# Patient Record
Sex: FEMALE | Race: ASIAN | Employment: FULL TIME | URBAN - METROPOLITAN AREA
[De-identification: names, ages, dates, MRNs, and addresses within clinical notes are randomized per-mention and may not be internally consistent; named-entity substitution may affect disease eponyms.]

---

## 2018-05-24 RX ORDER — ATROPINE SULFATE 0.1 MG/ML
0.5 INJECTION INTRAVENOUS
Status: CANCELLED | OUTPATIENT
Start: 2018-05-24 | End: 2018-05-24

## 2018-05-24 RX ORDER — EPINEPHRINE 0.1 MG/ML
1 INJECTION INTRACARDIAC; INTRAVENOUS
Status: CANCELLED | OUTPATIENT
Start: 2018-05-24 | End: 2018-05-24

## 2018-05-24 RX ORDER — DEXTROMETHORPHAN/PSEUDOEPHED 2.5-7.5/.8
1.2 DROPS ORAL
Status: CANCELLED | OUTPATIENT
Start: 2018-05-24

## 2018-05-25 ENCOUNTER — HOSPITAL ENCOUNTER (OUTPATIENT)
Age: 53
Setting detail: OUTPATIENT SURGERY
Discharge: HOME | End: 2018-05-25
Attending: INTERNAL MEDICINE | Admitting: INTERNAL MEDICINE
Payer: COMMERCIAL

## 2018-05-25 VITALS
SYSTOLIC BLOOD PRESSURE: 104 MMHG | HEART RATE: 46 BPM | RESPIRATION RATE: 15 BRPM | DIASTOLIC BLOOD PRESSURE: 63 MMHG | TEMPERATURE: 96.8 F | HEIGHT: 62 IN | OXYGEN SATURATION: 99 % | BODY MASS INDEX: 24.69 KG/M2 | WEIGHT: 134.2 LBS

## 2018-05-25 PROCEDURE — 76040000007: Performed by: INTERNAL MEDICINE

## 2018-05-25 PROCEDURE — 74011250636 HC RX REV CODE- 250/636

## 2018-05-25 PROCEDURE — G0500 MOD SEDAT ENDO SERVICE >5YRS: HCPCS | Performed by: INTERNAL MEDICINE

## 2018-05-25 PROCEDURE — 74011250636 HC RX REV CODE- 250/636: Performed by: INTERNAL MEDICINE

## 2018-05-25 PROCEDURE — 77030020256 HC SOL INJ NACL 0.9%  500ML: Performed by: INTERNAL MEDICINE

## 2018-05-25 RX ORDER — FLUMAZENIL 0.1 MG/ML
0.2 INJECTION INTRAVENOUS
Status: DISCONTINUED | OUTPATIENT
Start: 2018-05-25 | End: 2018-05-25 | Stop reason: HOSPADM

## 2018-05-25 RX ORDER — NALOXONE HYDROCHLORIDE 0.4 MG/ML
0.4 INJECTION, SOLUTION INTRAMUSCULAR; INTRAVENOUS; SUBCUTANEOUS
Status: DISCONTINUED | OUTPATIENT
Start: 2018-05-25 | End: 2018-05-25 | Stop reason: HOSPADM

## 2018-05-25 RX ORDER — SODIUM CHLORIDE 9 MG/ML
100 INJECTION, SOLUTION INTRAVENOUS CONTINUOUS
Status: DISCONTINUED | OUTPATIENT
Start: 2018-05-25 | End: 2018-05-25 | Stop reason: HOSPADM

## 2018-05-25 RX ORDER — MIDAZOLAM HYDROCHLORIDE 1 MG/ML
.5-5 INJECTION, SOLUTION INTRAMUSCULAR; INTRAVENOUS
Status: DISCONTINUED | OUTPATIENT
Start: 2018-05-25 | End: 2018-05-25 | Stop reason: HOSPADM

## 2018-05-25 RX ORDER — BISMUTH SUBSALICYLATE 262 MG
1 TABLET,CHEWABLE ORAL DAILY
COMMUNITY

## 2018-05-25 RX ORDER — FENTANYL CITRATE 50 UG/ML
100 INJECTION, SOLUTION INTRAMUSCULAR; INTRAVENOUS
Status: DISCONTINUED | OUTPATIENT
Start: 2018-05-25 | End: 2018-05-25 | Stop reason: HOSPADM

## 2018-05-25 RX ADMIN — SODIUM CHLORIDE 100 ML/HR: 900 INJECTION, SOLUTION INTRAVENOUS at 08:38

## 2018-05-25 NOTE — DISCHARGE INSTRUCTIONS
Ramos Wu   DISCHARGE SUMMARY from Nurse    PATIENT INSTRUCTIONS:    After general anesthesia or intravenous sedation, for 24 hours or while taking prescription Narcotics:  · Limit your activities  · Do not drive and operate hazardous machinery  · Do not make important personal or business decisions  · Do  not drink alcoholic beverages  · If you have not urinated within 8 hours after discharge, please contact your surgeon on call. Report the following to your surgeon:  · Excessive pain, swelling, redness or odor of or around the surgical area  · Temperature over 100.5  · Nausea and vomiting lasting longer than 4 hours or if unable to take medications  · Any signs of decreased circulation or nerve impairment to extremity: change in color, persistent  numbness, tingling, coldness or increase pain  · Any questions    What to do at Home:  Recommended activity: Activity as tolerated,   If you experience any of the following symptoms  As above , please follow up with Doctor Phillip Rayo  *  Please give a list of your current medications to your Primary Care Provider. *  Please update this list whenever your medications are discontinued, doses are      changed, or new medications (including over-the-counter products) are added. *  Please carry medication information at all times in case of emergency situations. These are general instructions for a healthy lifestyle:    No smoking/ No tobacco products/ Avoid exposure to second hand smoke  Surgeon General's Warning:  Quitting smoking now greatly reduces serious risk to your health.     Obesity, smoking, and sedentary lifestyle greatly increases your risk for illness    A healthy diet, regular physical exercise & weight monitoring are important for maintaining a healthy lifestyle    You may be retaining fluid if you have a history of heart failure or if you experience any of the following symptoms:  Weight gain of 3 pounds or more overnight or 5 pounds in a week, increased swelling in our hands or feet or shortness of breath while lying flat in bed. Please call your doctor as soon as you notice any of these symptoms; do not wait until your next office visit. Recognize signs and symptoms of STROKE:    F-face looks uneven    A-arms unable to move or move unevenly    S-speech slurred or non-existent    T-time-call 911 as soon as signs and symptoms begin-DO NOT go       Back to bed or wait to see if you get better-TIME IS BRAIN. Warning Signs of HEART ATTACK     Call 911 if you have these symptoms:   Chest discomfort. Most heart attacks involve discomfort in the center of the chest that lasts more than a few minutes, or that goes away and comes back. It can feel like uncomfortable pressure, squeezing, fullness, or pain.  Discomfort in other areas of the upper body. Symptoms can include pain or discomfort in one or both arms, the back, neck, jaw, or stomach.  Shortness of breath with or without chest discomfort.  Other signs may include breaking out in a cold sweat, nausea, or lightheadedness. Don't wait more than five minutes to call 911 - MINUTES MATTER! Fast action can save your life. Calling 911 is almost always the fastest way to get lifesaving treatment. Emergency Medical Services staff can begin treatment when they arrive -- up to an hour sooner than if someone gets to the hospital by car. The discharge information has been reviewed with the patient and spouse. The patient and spouse verbalized understanding. Discharge medications reviewed with the patient and spouse and appropriate educational materials and side effects teaching were provided.   ___________________________________________________________________________________________________________________________________  [id number]  1965    COLON DISCHARGE INSTRUCTIONS    Discomfort:  Redness at IV site- apply warm compress to area; if redness or soreness persist- contact your physician  There may be a slight amount of blood passed from the rectum  Gaseous discomfort- walking, belching will help relieve any discomfort  You may not operate a vehicle til the next day. You may not engage in an occupation involving machinery or appliances til the next day. You may not drink alcoholic beverages til the next day. DIET:   High fiber diet. ACTIVITY:  You may not  resume your normal daily activities til the next day. it is recommended that you spend the remainder of the day resting -  avoid any strenuous activity. CALL M.D.  IF ANY SIGN OF:   Increasing pain, nausea, vomiting  Abdominal distension (swelling)  New increased bleeding (oral or rectal)  Fever (chills)  Pain in chest area  Bloody discharge from nose or mouth  Shortness of breath    You may  take any Advil, Aspirin, Ibuprofen, Motrin, Aleve, or Goodys but preferably Tylenol as needed for pain. Post procedure diagnosis:  Small anal skin tags  Follow-up Instructions: Your follow up colonoscopy will be in 10 years.         Maryan Otero MD  May 25, 2018   Patient armband removed and shredded

## 2018-05-25 NOTE — PROCEDURES
[facility]  Colonoscopy Procedure Report  _______________________________________________________  Patient: Joaquín White                                         Attending Physician: Mayra Navarro MD    Patient ID: [id number]                                      Referring Physician: Juaquin Moncada DO    Exam Date: May 25, 2018 _______________________________________________________      Introduction: A  46 y.o. female patient, presents for outpatient Colonoscopy    Indications: Screen colon cancer, average risk and asymptomatic. Consent: The benefits, risks, and alternatives to the procedure were discussed and informed consent was obtained from the patient. Preparation: EKG, pulse, pulse oximetry and blood pressure were monitored throughout the procedure. ASA Classification: Class 1 - . The heart is an S1-S2 and regular heart rate and rhythm. Lungs are clear to auscultation and percussion. Abdomen is soft, nondistended, and nontender. Mental Status: awake, alert, and oriented to person, place, and time    Medications:  · Fentanyl 100 mcg IV before procedure. · Versed 4 mg IV throughout the procedure. Rectal Exam: Small anal skin tags. Slightly decreased anal sphincter tone. No Blood. Pathology Specimens: No specimens removed. Procedure: The colonoscope was passed with ease through the anus under direct visualization and advanced to the cecum and 15 cm inside the terminal ileum. The patient required positioning on the back to aid in the passage of the scope. The scope was withdrawn and the mucosa was carefully examined. The quality of the preparation was excellent. The views were excellent. The patient's toleration of the procedure was excellent. Retroflexion was preformed in the ascending colon and hepatic flexure. The exam was done twice to the cecum. Total time is 14 minutes and withdrawal time is 8 minutes.     Findings:    Rectum:   Normal  Sigmoid:   normal   Descending Colon:   Normal  Transverse Colon:   Normal  Ascending Colon:   Normal  Cecum:   Normal  Terminal Ileum:   Normal      Unplanned Events: There were no unplanned events. Estimated Blood Loss: None  Impressions:  Small anal skin tags. Slightly decreased anal sphincter tone. Slightly tortuous sigmoid colon. Normal Mucosa. No diverticula or polyps found. Complications: None; patient tolerated the procedure well. Recommendations:  · Discharge home when standard parameters are met. · Resume a high fiber diet. · Colonoscopy recommendation in 10 years.     Procedure Codes:    · COLONOSCOPY [HBA1124 (Type: Custom)]    Endoscope Information:  Model Number(s)    [id number]       Assistant: None      Signed By: Desirae Escobar MD Date: May 25, 2018

## 2018-05-25 NOTE — H&P
This 46year old female presents for Colon Cancer Screening. History of Present Illness:  1. Colon Cancer Screening      No prior screening. Denies risk factors. Pertinent negatives include abdominal pain, change in bowel habits, change in stool caliber, constipation, decreased appetite, diarrhea, melena, nausea, rectal bleeding, vomiting, weight gain and weight loss. Additional information: No family history of colon cancer, No family history of Crohn's/colitis, No NSAID/ASA use, No abd surgeries. BM once a day, no blood. She denies heart attack, SOB, CP, CVA, GERD, DM and paralysis. PROBLEM LIST:  No active problems        PAST MEDICAL/SURGICAL HISTORY   (Detailed)    Disease/disorder Onset Date Management Date Comments     Wrist surgery       DIAGNOSTICS HISTORY:  Test Ordered Interpretation Result completed   Dexa, Bone Density Scan  see detail OSTEOPENIA 2016     Test Ordered Ordering Comments Modifier   Dexa, Bone Density Scan      GYNECOLOGIC HISTORY:  Patient is postmenopausal.   Postmenopausal age: 43. Type of menopause is natural .    Family History:  (Detailed)  Relationship Family Member Name  Age at Death Condition Onset Age Cause of Death   Father  Y       Mother  Y 68            Social History:  (Detailed)  Tobacco use reviewed. Preferred language is [place]. Smoking status: Never smoker. SMOKING STATUS  Use Status Type Smoking Status Usage Per Day Years Used Total Pack Years   no/never  Never smoker          ALCOHOL  There is a history of alcohol use. Type: Beer. 1 beer consumed weekly. Last alcoholic drink was two weeks ago. CAFFEINE  The patient uses caffeine: coffee - 2 cups a day. LIFESTYLE  Moderate activity level. Exercises daily. Patient Status   Completed with information received for patient transitioning into care. Medication Reconciliation  Medications reconciled today.   Medication Reviewed  Adherence Medication Name Sig Desc Elsewhere Status   taking as directed Suprep Bowel Prep Kit 17.5 gram-3.13 gram-1.6 gram oral solution take as prescribed by physician N Verified     Medications (added, continued or stopped this visit):  Started Medication Directions PRN Status PRN Reason Instruction Stopped   05/10/2018 Suprep Bowel Prep Kit 17.5 gram-3.13 gram-1.6 gram oral solution take as prescribed by physician N        Allergies:  Ingredient Reaction Medication Name Comment   NO KNOWN ALLERGIES          ORDERS:  Status Lab Order Time Frame Comments   ordered GASTROENTEROLOGY PROCEDURE within 1 Month at location [address] surgeon scheduled is Shelly Yeager MD. An assistant has not been requested. suprep. Review of Systems  System Neg/Pos Details   Constitutional Negative Chills, fever, malaise, weight gain and weight loss. ENMT Negative Nasal congestion and sore throat. Eyes Positive Double vision. Respiratory Negative Asthma, chronic cough and wheezing. Cardio Negative Chest pain, edema and irregular heartbeat/palpitations. GI Negative Abdominal pain, change in bowel habits, change in stool caliber, constipation, decreased appetite, diarrhea, dysphagia, heartburn, hematemesis, hematochezia, melena, nausea, rectal bleeding, reflux and vomiting.  Negative Dysuria and hematuria. Endocrine Negative Cold intolerance, heat intolerance and increased thirst.   Neuro Negative Dizziness, headache, numbness, tremors and vertigo. Psych Negative Anxiety, depression and increased stress. Integumentary Negative Hives and rash. MS Positive Joint pain. MS Negative Back pain and myalgia. Hema/Lymph Negative Easy bleeding and easy bruising. Allergic/Immuno Positive Seasonal allergies. Allergic/Immuno Negative Contact allergy and food allergies. Reproductive Positive The patient is post-menopausal (Occurred at age 43. The menopause was natural).         Vital Signs     Gynecologic History  Patient is postmenopausal.      Height  Time ft in cm Last Measured Height Position   9:21 AM 5.0 3.00 160.02 05/10/2018 0     Weight/BSA/BMI  Time lb oz kg Context BMI kg/m2 BSA m2   9:21 .00  63.503 dressed with shoes 24.80      Date/Time Temp Pulse BP MAP (Calculated) Arterial Line 1 BP (mmHg) BP Patient Position Resp SpO2 O2 Device O2 Flow Rate (L/min) Pre/Post Ductal Weight      05/25/18 1000 --  52 121/70 87 -- -- 16 100 % Nasal cannula 2 l/min -- --     05/25/18 0822 96.8 °F (36 °C) 62 108/61 77 -- -- 18 96 % Room air -- -- 60.9 kg (134 lb 3.2 oz)         PHYSICAL EXAM:  Exam Findings Details   Constitutional Normal No acute distress. Well Nourished. Well developed. Eyes Normal General - Right: Normal, Left: Normal. Conjunctiva - Right: Normal, Left: Normal. Sclera - Right: Normal, Left: Normal. Pupil - Right: Normal, Left: Normal.   Nose/Mouth/Throat Normal Lips/teeth/gums - Normal. Tongue - Normal. Buccal mucosa - Normal. Palate & uvula - Normal.   Neck Exam Normal Inspection - Normal. Palpation - Normal. Thyroid gland - Normal. Submandibular lymph nodes - Normal.   Lymph Detail Normal Submandibular. Anterior cervical. Posterior cervical. Supraclavicular. Respiratory Normal Inspection - Normal. Auscultation - Normal. Percussion - Normal. Cough - Absent. Effort - Normal.   Cardiovascular Normal Heart rate - Regular rate. Heart sounds - Normal S1, Normal S2. Murmurs - None. Extremities - No edema. Abdomen Normal Inspection - Normal. Appliance(s) - None. Abdominal muscles - Normal. Auscultation - Normal. Percussion - Normal. Anterior palpation - Normal, No guarding, No rebound. CVA tenderness - None. Umbilicus - Normal. No abdominal tenderness. No hepatic enlargement. No splenic enlargement. No hernia. No Ascites. No palpable mass. Colin's sign - Negative. Skin Normal Inspection - Normal.   Musculoskeletal Normal Hands - Left: Normal, Right: Normal.   Extremity Normal No cyanosis.  No edema. Clubbing - Absent. Neurological Normal Level of consciousness - Normal. Orientation - Normal. Memory - Normal. Motor - Normal. Balance & gait - Normal. Coordination - Normal. Fine motor skills - Normal. DTRs - Normal.   Psychiatric Normal Orientation - Oriented to time, place, person & situation. Not anxious. Appropriate mood and affect. Behavior appropriate for age. Sufficient fund of knowledge. Sufficient language. No memory loss. Assessment/Plan  # Detail Type Description    1. Assessment Encounter for screening colonoscopy (Z12.11). Patient Plan 45 y/o female referred by Dr. Otoniel Bond for colon cancer screening. She has average risk for colon cancer and asymptomatic. She would be having her screening colonoscopy. I explained to her the procedure of colonoscopy and the risks involved which include but not limited to reaction to sedation, bleeding, perforation, infection or missing a lesion if bowels are not well prepped or are unusually tortuous. she agreed to proceed with the procedure and answered her questions. I gave her the Suprep to clean her bowels. I advised her to take if needed extra laxatives for few days before incase she is on the constipated side to assure adequate bowel prep. Told her not take her medications in the morning of the procedure because they would be flushed out with the prep but can take them more confidently after the procedure. I advised her to bring all her medication with her. Plan Orders She will be scheduled for GASTROENTEROLOGY PROCEDURE within 1 Month on 05/25/2018.

## 2018-05-25 NOTE — IP AVS SNAPSHOT
[address] [address] 
[phone] Patient: Kristel Leonardo MRN: [id number] :1965 About your hospitalization You were admitted on:  May 25, 2018 You last received care in the:  [facility] ENDOSCOPY You were discharged on:  May 25, 2018 Why you were hospitalized Your primary diagnosis was:  Not on File Follow-up Information Follow up With Details Comments Contact Info Josefa Keene MD   [address] [place] [address] [address] [place] [address] 
[phone] Imelda Waters, [address] [facility] [address] [place] [address] 
[phone] Discharge Orders None A check vandana indicates which time of day the medication should be taken. My Medications CONTINUE taking these medications Instructions Each Dose to Equal  
 Morning Noon Evening Bedtime  
 multivitamin tablet Commonly known as:  ONE A DAY Your last dose was: Your next dose is: Take 1 Tab by mouth daily. 1 Tab Discharge Instructions [place] DISCHARGE SUMMARY from Nurse PATIENT INSTRUCTIONS: 
 
 
F-face looks uneven A-arms unable to move or move unevenly S-speech slurred or non-existent T-time-call 911 as soon as signs and symptoms begin-DO NOT go Back to bed or wait to see if you get better-TIME IS BRAIN. Warning Signs of HEART ATTACK Call 911 if you have these symptoms: 
? Chest discomfort. Most heart attacks involve discomfort in the center of the chest that lasts more than a few minutes, or that goes away and comes back. It can feel like uncomfortable pressure, squeezing, fullness, or pain. ? Discomfort in other areas of the upper body. Symptoms can include pain or discomfort in one or both arms, the back, neck, jaw, or stomach. ? Shortness of breath with or without chest discomfort. ? Other signs may include breaking out in a cold sweat, nausea, or lightheadedness. Don't wait more than five minutes to call [address]! Fast action can save your life. Calling 911 is almost always the fastest way to get lifesaving treatment. Emergency Medical Services staff can begin treatment when they arrive  up to an hour sooner than if someone gets to the hospital by car. The discharge information has been reviewed with the patient and spouse. The patient and spouse verbalized understanding. Discharge medications reviewed with the patient and spouse and appropriate educational materials and side effects teaching were provided. ___________________________________________________________________________________________________________________________________ 
[id number] 
1965 COLON DISCHARGE INSTRUCTIONS Discomfort: 
Redness at IV site- apply warm compress to area; if redness or soreness persist- contact your physician There may be a slight amount of blood passed from the rectum Gaseous discomfort- walking, belching will help relieve any discomfort You may not operate a vehicle til the next day. You may not engage in an occupation involving machinery or appliances til the next day. You may not drink alcoholic beverages til the next day. DIET: 
 High fiber diet. ACTIVITY: 
You may not  resume your normal daily activities til the next day. it is recommended that you spend the remainder of the day resting -  avoid any strenuous activity. CALL HANDY [place] ANY SIGN OF: Increasing pain, nausea, vomiting Abdominal distension (swelling) New increased bleeding (oral or rectal) Fever (chills) Pain in chest area Bloody discharge from nose or mouth Shortness of breath You may  take any Advil, Aspirin, Ibuprofen, Motrin, Aleve, or Goodys but preferably Tylenol as needed for pain. Post procedure diagnosis:  Small anal skin tags Follow-up Instructions: Your follow up colonoscopy will be in 10 years. Jennifer Lane MD 
May 25, 2018 Patient armband removed and shredded Introducing [facility] SERVICES! Jake Lily introduces [employer] patient portal. Now you can access parts of your medical record, email your doctor's office, and request medication refills online. 1. In your internet browser, go to [email or web address] 2. Click on the First Time User? Click Here link in the Sign In box. You will see the New Member Sign Up page. 3. Enter your [employer] Access Code exactly as it appears below. You will not need to use this code after youve completed the sign-up process. If you do not sign up before the expiration date, you must request a new code. · [employer] Access Code: [email or web address] Expires: 8/23/2018 10:53 AM 
 
4. Enter the last four digits of your Social Security Number (xxxx) and Date of Birth (mm/dd/yyyy) as indicated and click Submit. You will be taken to the next sign-up page. 5. Create a [employer] ID. This will be your [employer] login ID and cannot be changed, so think of one that is secure and easy to remember. 6. Create a [employer] password. You can change your password at any time. 7. Enter your Password Reset Question and Answer. This can be used at a later time if you forget your password. 8. Enter your e-mail address. You will receive e-mail notification when new information is available in [address] 9. Click Sign Up. You can now view and download portions of your medical record. 10. Click the Download Summary menu link to download a portable copy of your medical information.  
 
If you have questions, please visit the Frequently Asked Questions section of the [employer]. Remember, [employer] is NOT to be used for urgent needs. For medical emergencies, dial 911. Now available from your iPhone and Android! Introducing [employer] As a Aimee Mattsons patient, I wanted to make you aware of our electronic visit tool called [employer]. [employer] 24/7 allows you to connect within minutes with a medical provider 24 hours a day, seven days a week via a mobile device or tablet or logging into a secure website from your computer. You can access Sergey [place] from anywhere in the [place]. A virtual visit might be right for you when you have a simple condition and feel like you just dont want to get out of bed, or cant get away from work for an appointment, when your regular [facility] provider is not available (evenings, weekends or holidays), or when youre out of town and need minor care. Electronic visits cost only $49 and if the [facility] [employer] 24/7 provider determines a prescription is needed to treat your condition, one can be electronically transmitted to a nearby pharmacy*. Please take a moment to enroll today if you have not already done so. The enrollment process is free and takes just a few minutes. To enroll, please download the [employer] 24/7 hilario to your tablet or phone, or visit [email or web address] ([email or web address] to enroll on your computer. And, as an [address] [place] [address] patient with a Q[employer]oud account, the results of your visits will be scanned into your electronic medical record and your primary care provider will be able to view the scanned results. We urge you to continue to see your regular [facility] provider for your ongoing medical care. And while your primary care provider may not be the one available when you seek a [employer] virtual visit, the peace of mind you get from getting a real diagnosis real time can be priceless. For more information on [employer], view our Frequently Asked Questions (FAQs) at [email or web address]. Sincerely, 
 
Mehdi Pimentel MD 
Chief Medical Officer [address] *:  certain medications cannot be prescribed via [employer] Providers Seen During Your Hospitalization Provider Specialty Primary office phone Anne-Marie Chan MD Gastroenterology [phone] Your Primary Care Physician (PCP) Primary Care Physician Office Phone Office Fax Donelda Smoker [phone] You are allergic to the following No active allergies Recent Documentation Height Weight Breastfeeding? BMI OB Status Smoking Status 1.575 m 60.9 kg No 24.55 kg/m2 Menopause Never Smoker Emergency Contacts Name Discharge Info Relation Home Work Mobile BautistaPoolCubesdaquan OggiFinogi CAREGIVER [3] Spouse [3]   [phone] Patient Belongings The following personal items are in your possession at time of discharge: 
  Dental Appliances: None  Visual Aid: None Please provide this summary of care documentation to your next provider. Signatures-by signing, you are acknowledging that this After Visit Summary has been reviewed with you and you have received a copy. Patient Signature:  ____________________________________________________________ Date:  ____________________________________________________________  
  
Radha Lugo Provider Signature:  ____________________________________________________________ Date:  ____________________________________________________________

## (undated) DEVICE — SYR 5ML 1/5 GRAD LL NSAF LF --

## (undated) DEVICE — KENDALL RADIOLUCENT FOAM MONITORING ELECTRODE RECTANGULAR SHAPE: Brand: KENDALL

## (undated) DEVICE — WRISTBAND ID AD W2.5XL9.5CM RED VYN ADH CLSR UNI-PRINT

## (undated) DEVICE — MEDI-VAC NON-CONDUCTIVE SUCTION TUBING: Brand: CARDINAL HEALTH

## (undated) DEVICE — CATH IV SAFE STR 22GX1IN BLU -- PROTECTIV PLUS

## (undated) DEVICE — CANNULA CUSH AD W/ 14FT TBG

## (undated) DEVICE — SYR 3ML LL TIP 1/10ML GRAD --

## (undated) DEVICE — TRAP SPEC COLL POLYP POLYSTYR --

## (undated) DEVICE — TRNQT TEXT 1X18IN BLU LF DISP -- CONVERT TO ITEM 362165

## (undated) DEVICE — CATH SUC CTRL PRT TRIFLO 14FR --

## (undated) DEVICE — SOLUTION IV 500ML 0.9% SOD CHL FLX CONT

## (undated) DEVICE — SYRINGE 50ML E/T

## (undated) DEVICE — NDL FLTR TIP 5 MIC 18GX1.5IN --

## (undated) DEVICE — SPONGE GZ W4XL4IN RAYON POLY 4 PLY NONWOVEN FASTER WICKING

## (undated) DEVICE — ENDO CARRY-ON PROCEDURE KIT INCLUDES ENZYMATIC SPONGE, GAUZE, BIOHAZARD LABEL, TRAY, LUBRICANT, DIRTY SCOPE LABEL, WATER LABEL, TRAY, DRAWSTRING PAD, AND DEFENDO 4-PIECE KIT.: Brand: ENDO CARRY-ON PROCEDURE KIT

## (undated) DEVICE — MAJ-1414 SINGLE USE ADPATER BIOPSY VALV: Brand: SINGLE USE ADAPTOR BIOPSY VALVE

## (undated) DEVICE — SINGLE PORT MANIFOLD: Brand: NEPTUNE 2

## (undated) DEVICE — SPONGE GZ W4XL4IN COT 12 PLY TYP VII WVN C FLD DSGN

## (undated) DEVICE — SET ADMIN 16ML TBNG L100IN 2 Y INJ SITE IV PIGGY BK DISP

## (undated) DEVICE — NDL PRT INJ NSAF BLNT 18GX1.5 --